# Patient Record
Sex: FEMALE | URBAN - METROPOLITAN AREA
[De-identification: names, ages, dates, MRNs, and addresses within clinical notes are randomized per-mention and may not be internally consistent; named-entity substitution may affect disease eponyms.]

---

## 2019-10-08 ENCOUNTER — APPOINTMENT (RX ONLY)
Dept: URBAN - METROPOLITAN AREA CLINIC 152 | Facility: CLINIC | Age: 84
Setting detail: DERMATOLOGY
End: 2019-10-08

## 2019-10-08 DIAGNOSIS — L91.8 OTHER HYPERTROPHIC DISORDERS OF THE SKIN: ICD-10-CM

## 2019-10-08 DIAGNOSIS — L82.0 INFLAMED SEBORRHEIC KERATOSIS: ICD-10-CM

## 2019-10-08 DIAGNOSIS — L81.4 OTHER MELANIN HYPERPIGMENTATION: ICD-10-CM

## 2019-10-08 DIAGNOSIS — L82.1 OTHER SEBORRHEIC KERATOSIS: ICD-10-CM

## 2019-10-08 PROBLEM — I10 ESSENTIAL (PRIMARY) HYPERTENSION: Status: ACTIVE | Noted: 2019-10-08

## 2019-10-08 PROBLEM — E03.9 HYPOTHYROIDISM, UNSPECIFIED: Status: ACTIVE | Noted: 2019-10-08

## 2019-10-08 PROCEDURE — ? LIQUID NITROGEN

## 2019-10-08 PROCEDURE — ? ELECTRODESICCATION

## 2019-10-08 PROCEDURE — 17110 DESTRUCTION B9 LES UP TO 14: CPT

## 2019-10-08 PROCEDURE — ? COUNSELING

## 2019-10-08 PROCEDURE — 99203 OFFICE O/P NEW LOW 30 MIN: CPT | Mod: 25

## 2019-10-08 ASSESSMENT — LOCATION DETAILED DESCRIPTION DERM
LOCATION DETAILED: RIGHT INFERIOR MEDIAL FOREHEAD
LOCATION DETAILED: LEFT CLAVICULAR SKIN
LOCATION DETAILED: LEFT INFERIOR LATERAL NECK
LOCATION DETAILED: RIGHT LATERAL SUPERIOR CHEST
LOCATION DETAILED: RIGHT CLAVICULAR NECK
LOCATION DETAILED: RIGHT ANTERIOR SHOULDER
LOCATION DETAILED: RIGHT CLAVICULAR SKIN
LOCATION DETAILED: RIGHT INFERIOR ANTERIOR NECK

## 2019-10-08 ASSESSMENT — LOCATION ZONE DERM
LOCATION ZONE: FACE
LOCATION ZONE: ARM
LOCATION ZONE: NECK
LOCATION ZONE: TRUNK

## 2019-10-08 ASSESSMENT — LOCATION SIMPLE DESCRIPTION DERM
LOCATION SIMPLE: LEFT ANTERIOR NECK
LOCATION SIMPLE: RIGHT SHOULDER
LOCATION SIMPLE: RIGHT FOREHEAD
LOCATION SIMPLE: RIGHT CLAVICULAR SKIN
LOCATION SIMPLE: CHEST
LOCATION SIMPLE: RIGHT ANTERIOR NECK
LOCATION SIMPLE: LEFT CLAVICULAR SKIN

## 2019-10-08 NOTE — PROCEDURE: MIPS QUALITY
Quality 110: Preventive Care And Screening: Influenza Immunization: Influenza Immunization Administered during Influenza season
Quality 474: Zoster Vaccination Status: Shingrix Vaccination Administered or Previously Received
Quality 111:Pneumonia Vaccination Status For Older Adults: Pneumococcal Vaccination Previously Received
Detail Level: Detailed
Quality 131: Pain Assessment And Follow-Up: Pain assessment using a standardized tool is documented as negative, no follow-up plan required

## 2022-02-08 ENCOUNTER — PREPPED CHART (OUTPATIENT)
Dept: URBAN - METROPOLITAN AREA CLINIC 89 | Facility: CLINIC | Age: 87
End: 2022-02-08

## 2022-02-08 PROBLEM — H35.3122 DRY AMD, INTERMEDIATE DRY STAGE: Noted: 2022-02-08

## 2022-02-08 PROBLEM — H43.813 POSTERIOR VITREOUS DETACHMENT: Noted: 2022-02-08

## 2022-02-08 PROBLEM — H35.3211 NEOVASCULAR AMD WITH ACTIVE CNV: Noted: 2022-02-08

## 2022-03-17 ASSESSMENT — VISUAL ACUITY
OS_CC: 20/25-1
OD_CC: 20/30

## 2022-03-17 ASSESSMENT — TONOMETRY
OD_IOP_MMHG: 16
OS_IOP_MMHG: 17

## 2022-03-22 ENCOUNTER — FOLLOW UP (OUTPATIENT)
Dept: URBAN - METROPOLITAN AREA CLINIC 89 | Facility: CLINIC | Age: 87
End: 2022-03-22

## 2022-03-22 DIAGNOSIS — H43.813: ICD-10-CM

## 2022-03-22 DIAGNOSIS — H35.3211: ICD-10-CM

## 2022-03-22 DIAGNOSIS — H35.3122: ICD-10-CM

## 2022-03-22 PROCEDURE — 92014 COMPRE OPH EXAM EST PT 1/>: CPT | Mod: 25

## 2022-03-22 PROCEDURE — 67028 INJECTION EYE DRUG: CPT

## 2022-03-22 PROCEDURE — 92134 CPTRZ OPH DX IMG PST SGM RTA: CPT

## 2022-03-22 PROCEDURE — 92202 OPSCPY EXTND ON/MAC DRAW: CPT | Mod: 59

## 2022-03-22 PROCEDURE — PF5 LUCENTIS PREFILLED SYRINGE

## 2022-03-22 ASSESSMENT — VISUAL ACUITY
OS_SC: 20/25-2
OD_SC: 20/40-

## 2022-03-22 ASSESSMENT — TONOMETRY
OD_IOP_MMHG: 14
OS_IOP_MMHG: 14

## 2022-05-24 ENCOUNTER — FOLLOW UP (OUTPATIENT)
Dept: URBAN - METROPOLITAN AREA CLINIC 89 | Facility: CLINIC | Age: 87
End: 2022-05-24

## 2022-05-24 DIAGNOSIS — H35.3122: ICD-10-CM

## 2022-05-24 DIAGNOSIS — H43.813: ICD-10-CM

## 2022-05-24 DIAGNOSIS — H35.3211: ICD-10-CM

## 2022-05-24 PROCEDURE — PF5 LUCENTIS PREFILLED SYRINGE

## 2022-05-24 PROCEDURE — 67028 INJECTION EYE DRUG: CPT

## 2022-05-24 PROCEDURE — 92014 COMPRE OPH EXAM EST PT 1/>: CPT | Mod: 25

## 2022-05-24 PROCEDURE — 92202 OPSCPY EXTND ON/MAC DRAW: CPT | Mod: 59

## 2022-05-24 PROCEDURE — 92134 CPTRZ OPH DX IMG PST SGM RTA: CPT

## 2022-05-24 ASSESSMENT — VISUAL ACUITY
OD_CC: 20/25+1
OS_CC: 20/25-2

## 2022-05-24 ASSESSMENT — TONOMETRY
OD_IOP_MMHG: 15
OS_IOP_MMHG: 16

## 2022-07-26 ENCOUNTER — FOLLOW UP (OUTPATIENT)
Dept: URBAN - METROPOLITAN AREA CLINIC 89 | Facility: CLINIC | Age: 87
End: 2022-07-26

## 2022-07-26 DIAGNOSIS — H35.3122: ICD-10-CM

## 2022-07-26 DIAGNOSIS — H43.813: ICD-10-CM

## 2022-07-26 DIAGNOSIS — H35.3211: ICD-10-CM

## 2022-07-26 PROCEDURE — 92134 CPTRZ OPH DX IMG PST SGM RTA: CPT

## 2022-07-26 PROCEDURE — PF5 LUCENTIS PREFILLED SYRINGE

## 2022-07-26 PROCEDURE — 67028 INJECTION EYE DRUG: CPT

## 2022-07-26 PROCEDURE — 92014 COMPRE OPH EXAM EST PT 1/>: CPT | Mod: 25

## 2022-07-26 PROCEDURE — 92202 OPSCPY EXTND ON/MAC DRAW: CPT | Mod: 59

## 2022-07-26 ASSESSMENT — TONOMETRY
OS_IOP_MMHG: 15
OD_IOP_MMHG: 17

## 2022-07-26 ASSESSMENT — VISUAL ACUITY
OD_CC: 20/30-2
OS_CC: 20/25-2

## 2022-11-11 ENCOUNTER — FOLLOW UP (OUTPATIENT)
Dept: URBAN - METROPOLITAN AREA CLINIC 89 | Facility: CLINIC | Age: 87
End: 2022-11-11

## 2022-11-11 DIAGNOSIS — H43.813: ICD-10-CM

## 2022-11-11 DIAGNOSIS — H35.3231: ICD-10-CM

## 2022-11-11 PROCEDURE — PF5 LUCENTIS PREFILLED SYRINGE

## 2022-11-11 PROCEDURE — 92202 OPSCPY EXTND ON/MAC DRAW: CPT | Mod: 59

## 2022-11-11 PROCEDURE — 92134 CPTRZ OPH DX IMG PST SGM RTA: CPT

## 2022-11-11 PROCEDURE — 92014 COMPRE OPH EXAM EST PT 1/>: CPT | Mod: 25

## 2022-11-11 PROCEDURE — 67028 INJECTION EYE DRUG: CPT

## 2022-11-11 ASSESSMENT — TONOMETRY
OS_IOP_MMHG: 15
OD_IOP_MMHG: 14

## 2022-11-11 ASSESSMENT — VISUAL ACUITY
OS_CC: 20/40
OD_CC: 20/40

## 2023-02-01 ENCOUNTER — FOLLOW UP (OUTPATIENT)
Dept: URBAN - METROPOLITAN AREA CLINIC 89 | Facility: CLINIC | Age: 88
End: 2023-02-01

## 2023-02-01 DIAGNOSIS — H43.813: ICD-10-CM

## 2023-02-01 DIAGNOSIS — H35.3231: ICD-10-CM

## 2023-02-01 PROCEDURE — PF5 LUCENTIS PREFILLED SYRINGE

## 2023-02-01 PROCEDURE — 92014 COMPRE OPH EXAM EST PT 1/>: CPT | Mod: 25

## 2023-02-01 PROCEDURE — 67028 INJECTION EYE DRUG: CPT

## 2023-02-01 PROCEDURE — 92134 CPTRZ OPH DX IMG PST SGM RTA: CPT

## 2023-02-01 PROCEDURE — 92202 OPSCPY EXTND ON/MAC DRAW: CPT | Mod: NC

## 2023-02-01 ASSESSMENT — VISUAL ACUITY
OS_CC: 20/40-
OD_CC: 20/40-1

## 2023-02-01 ASSESSMENT — TONOMETRY
OS_IOP_MMHG: 16
OD_IOP_MMHG: 14

## 2023-03-15 ENCOUNTER — FOLLOW UP (OUTPATIENT)
Dept: URBAN - METROPOLITAN AREA CLINIC 89 | Facility: CLINIC | Age: 88
End: 2023-03-15

## 2023-03-15 DIAGNOSIS — H35.3231: ICD-10-CM

## 2023-03-15 DIAGNOSIS — H43.813: ICD-10-CM

## 2023-03-15 PROCEDURE — 92202 OPSCPY EXTND ON/MAC DRAW: CPT | Mod: NC

## 2023-03-15 PROCEDURE — 67028 INJECTION EYE DRUG: CPT

## 2023-03-15 PROCEDURE — 92134 CPTRZ OPH DX IMG PST SGM RTA: CPT

## 2023-03-15 PROCEDURE — PF5 LUCENTIS PREFILLED SYRINGE

## 2023-03-15 PROCEDURE — 92014 COMPRE OPH EXAM EST PT 1/>: CPT | Mod: 25

## 2023-03-15 ASSESSMENT — VISUAL ACUITY
OS_CC: 20/60-2
OD_CC: 20/60-2

## 2023-03-15 ASSESSMENT — TONOMETRY
OS_IOP_MMHG: 15
OD_IOP_MMHG: 15

## 2023-05-02 ENCOUNTER — FOLLOW UP (OUTPATIENT)
Dept: URBAN - METROPOLITAN AREA CLINIC 89 | Facility: CLINIC | Age: 88
End: 2023-05-02

## 2023-05-02 DIAGNOSIS — H43.813: ICD-10-CM

## 2023-05-02 DIAGNOSIS — H35.3231: ICD-10-CM

## 2023-05-02 PROCEDURE — PF5 LUCENTIS PREFILLED SYRINGE

## 2023-05-02 PROCEDURE — 92134 CPTRZ OPH DX IMG PST SGM RTA: CPT

## 2023-05-02 PROCEDURE — 92202 OPSCPY EXTND ON/MAC DRAW: CPT | Mod: 59

## 2023-05-02 PROCEDURE — 92014 COMPRE OPH EXAM EST PT 1/>: CPT | Mod: 25

## 2023-05-02 PROCEDURE — 67028 INJECTION EYE DRUG: CPT

## 2023-05-02 ASSESSMENT — VISUAL ACUITY
OD_CC: 20/50-2
OS_CC: 20/40+2

## 2023-05-02 ASSESSMENT — TONOMETRY
OS_IOP_MMHG: 19
OD_IOP_MMHG: 18

## 2023-05-08 ENCOUNTER — PROBLEM (OUTPATIENT)
Dept: URBAN - METROPOLITAN AREA CLINIC 89 | Facility: CLINIC | Age: 88
End: 2023-05-08

## 2023-05-08 DIAGNOSIS — H35.3231: ICD-10-CM

## 2023-05-08 DIAGNOSIS — H57.11: ICD-10-CM

## 2023-05-08 PROCEDURE — 92014 COMPRE OPH EXAM EST PT 1/>: CPT

## 2023-05-08 PROCEDURE — 92134 CPTRZ OPH DX IMG PST SGM RTA: CPT

## 2023-05-08 PROCEDURE — 92202 OPSCPY EXTND ON/MAC DRAW: CPT

## 2023-05-08 ASSESSMENT — TONOMETRY
OD_IOP_MMHG: 14
OS_IOP_MMHG: 15

## 2023-05-08 ASSESSMENT — VISUAL ACUITY
OD_CC: 20/50-2
OS_CC: 20/40-2

## 2023-07-07 ENCOUNTER — PROBLEM (OUTPATIENT)
Dept: URBAN - METROPOLITAN AREA CLINIC 89 | Facility: CLINIC | Age: 88
End: 2023-07-07

## 2023-07-07 DIAGNOSIS — H35.3231: ICD-10-CM

## 2023-07-07 DIAGNOSIS — H43.813: ICD-10-CM

## 2023-07-07 PROCEDURE — 92201 OPSCPY EXTND RTA DRAW UNI/BI: CPT | Mod: 59

## 2023-07-07 PROCEDURE — PF5 LUCENTIS PREFILLED SYRINGE: Mod: JZ

## 2023-07-07 PROCEDURE — 92134 CPTRZ OPH DX IMG PST SGM RTA: CPT

## 2023-07-07 PROCEDURE — 92014 COMPRE OPH EXAM EST PT 1/>: CPT | Mod: 25

## 2023-07-07 PROCEDURE — 67028 INJECTION EYE DRUG: CPT

## 2023-07-07 ASSESSMENT — TONOMETRY
OS_IOP_MMHG: 13
OD_IOP_MMHG: 13

## 2023-07-07 ASSESSMENT — VISUAL ACUITY
OD_CC: 20/40
OS_CC: 20/50

## 2023-08-18 ENCOUNTER — FOLLOW UP (OUTPATIENT)
Dept: URBAN - METROPOLITAN AREA CLINIC 89 | Facility: CLINIC | Age: 88
End: 2023-08-18

## 2023-08-18 DIAGNOSIS — H43.813: ICD-10-CM

## 2023-08-18 DIAGNOSIS — H35.3231: ICD-10-CM

## 2023-08-18 PROCEDURE — 67028 INJECTION EYE DRUG: CPT

## 2023-08-18 PROCEDURE — 92202 OPSCPY EXTND ON/MAC DRAW: CPT | Mod: 59

## 2023-08-18 PROCEDURE — 92134 CPTRZ OPH DX IMG PST SGM RTA: CPT

## 2023-08-18 PROCEDURE — 92014 COMPRE OPH EXAM EST PT 1/>: CPT | Mod: 25

## 2023-08-18 PROCEDURE — PF5 LUCENTIS PREFILLED SYRINGE: Mod: JZ

## 2023-08-18 ASSESSMENT — VISUAL ACUITY
OD_CC: 20/30-2
OS_CC: 20/50

## 2023-08-18 ASSESSMENT — TONOMETRY
OS_IOP_MMHG: 11
OD_IOP_MMHG: 9

## 2023-09-29 ENCOUNTER — FOLLOW UP (OUTPATIENT)
Dept: URBAN - METROPOLITAN AREA CLINIC 89 | Facility: CLINIC | Age: 88
End: 2023-09-29

## 2023-09-29 DIAGNOSIS — H43.813: ICD-10-CM

## 2023-09-29 DIAGNOSIS — H35.3231: ICD-10-CM

## 2023-09-29 PROCEDURE — 92134 CPTRZ OPH DX IMG PST SGM RTA: CPT

## 2023-09-29 PROCEDURE — 67028 INJECTION EYE DRUG: CPT

## 2023-09-29 PROCEDURE — 92202 OPSCPY EXTND ON/MAC DRAW: CPT | Mod: NC

## 2023-09-29 PROCEDURE — PF5 LUCENTIS PREFILLED SYRINGE: Mod: JZ

## 2023-09-29 PROCEDURE — 92014 COMPRE OPH EXAM EST PT 1/>: CPT | Mod: 25

## 2023-09-29 ASSESSMENT — VISUAL ACUITY
OS_CC: 20/40-2
OD_CC: 20/25-2

## 2023-09-29 ASSESSMENT — TONOMETRY
OS_IOP_MMHG: 13
OD_IOP_MMHG: 13

## 2023-11-07 ENCOUNTER — FOLLOW UP (OUTPATIENT)
Dept: URBAN - METROPOLITAN AREA CLINIC 89 | Facility: CLINIC | Age: 88
End: 2023-11-07

## 2023-11-07 DIAGNOSIS — H43.813: ICD-10-CM

## 2023-11-07 DIAGNOSIS — H35.3231: ICD-10-CM

## 2023-11-07 PROCEDURE — 67028 INJECTION EYE DRUG: CPT

## 2023-11-07 PROCEDURE — 92134 CPTRZ OPH DX IMG PST SGM RTA: CPT

## 2023-11-07 PROCEDURE — 92014 COMPRE OPH EXAM EST PT 1/>: CPT | Mod: 25

## 2023-11-07 PROCEDURE — 92202 OPSCPY EXTND ON/MAC DRAW: CPT | Mod: NC

## 2023-11-07 ASSESSMENT — TONOMETRY
OD_IOP_MMHG: 8
OS_IOP_MMHG: 16

## 2023-11-07 ASSESSMENT — VISUAL ACUITY
OS_CC: 20/60-1
OD_CC: 20/50-1

## 2023-12-19 ENCOUNTER — FOLLOW UP (OUTPATIENT)
Dept: URBAN - METROPOLITAN AREA CLINIC 89 | Facility: CLINIC | Age: 88
End: 2023-12-19

## 2023-12-19 DIAGNOSIS — H43.813: ICD-10-CM

## 2023-12-19 DIAGNOSIS — H35.3231: ICD-10-CM

## 2023-12-19 PROCEDURE — 92014 COMPRE OPH EXAM EST PT 1/>: CPT | Mod: 25

## 2023-12-19 PROCEDURE — 92134 CPTRZ OPH DX IMG PST SGM RTA: CPT

## 2023-12-19 PROCEDURE — 92202 OPSCPY EXTND ON/MAC DRAW: CPT | Mod: 59

## 2023-12-19 PROCEDURE — 67028 INJECTION EYE DRUG: CPT

## 2023-12-19 ASSESSMENT — VISUAL ACUITY
OD_CC: 20/50-2
OS_CC: 20/60-1

## 2023-12-19 ASSESSMENT — TONOMETRY
OS_IOP_MMHG: 29
OS_IOP_MMHG: 18
OD_IOP_MMHG: 18
OD_IOP_MMHG: 25

## 2024-01-30 ENCOUNTER — FOLLOW UP (OUTPATIENT)
Dept: URBAN - METROPOLITAN AREA CLINIC 89 | Facility: CLINIC | Age: 89
End: 2024-01-30

## 2024-01-30 DIAGNOSIS — H43.813: ICD-10-CM

## 2024-01-30 DIAGNOSIS — H35.3231: ICD-10-CM

## 2024-01-30 PROCEDURE — 92201 OPSCPY EXTND RTA DRAW UNI/BI: CPT | Mod: 59

## 2024-01-30 PROCEDURE — 67028 INJECTION EYE DRUG: CPT

## 2024-01-30 PROCEDURE — 92134 CPTRZ OPH DX IMG PST SGM RTA: CPT

## 2024-01-30 PROCEDURE — 92014 COMPRE OPH EXAM EST PT 1/>: CPT | Mod: 25

## 2024-01-30 ASSESSMENT — TONOMETRY
OS_IOP_MMHG: 19
OD_IOP_MMHG: 19

## 2024-01-30 ASSESSMENT — VISUAL ACUITY
OS_CC: 20/50-2
OD_CC: 20/70+2

## 2024-03-12 ENCOUNTER — FOLLOW UP (OUTPATIENT)
Dept: URBAN - METROPOLITAN AREA CLINIC 89 | Facility: CLINIC | Age: 89
End: 2024-03-12

## 2024-03-12 DIAGNOSIS — H35.3231: ICD-10-CM

## 2024-03-12 DIAGNOSIS — H43.813: ICD-10-CM

## 2024-03-12 PROCEDURE — 92014 COMPRE OPH EXAM EST PT 1/>: CPT | Mod: 25

## 2024-03-12 PROCEDURE — 92202 OPSCPY EXTND ON/MAC DRAW: CPT | Mod: 59

## 2024-03-12 PROCEDURE — 92134 CPTRZ OPH DX IMG PST SGM RTA: CPT

## 2024-03-12 PROCEDURE — 67028 INJECTION EYE DRUG: CPT

## 2024-03-12 ASSESSMENT — VISUAL ACUITY
OS_CC: 20/30-2
OD_CC: 20/30

## 2024-03-12 ASSESSMENT — TONOMETRY
OD_IOP_MMHG: 19
OS_IOP_MMHG: 17

## 2024-05-07 ENCOUNTER — FOLLOW UP (OUTPATIENT)
Dept: URBAN - METROPOLITAN AREA CLINIC 89 | Facility: CLINIC | Age: 89
End: 2024-05-07

## 2024-05-07 DIAGNOSIS — H35.3231: ICD-10-CM

## 2024-05-07 DIAGNOSIS — H43.813: ICD-10-CM

## 2024-05-07 PROCEDURE — 92134 CPTRZ OPH DX IMG PST SGM RTA: CPT

## 2024-05-07 PROCEDURE — 92014 COMPRE OPH EXAM EST PT 1/>: CPT | Mod: 25

## 2024-05-07 PROCEDURE — 92202 OPSCPY EXTND ON/MAC DRAW: CPT | Mod: 59

## 2024-05-07 PROCEDURE — 67028 INJECTION EYE DRUG: CPT

## 2024-05-07 ASSESSMENT — TONOMETRY
OD_IOP_MMHG: 12
OS_IOP_MMHG: 12

## 2024-05-07 ASSESSMENT — VISUAL ACUITY
OD_CC: 20/100
OS_CC: 20/80-1

## 2024-07-16 ENCOUNTER — FOLLOW UP (OUTPATIENT)
Dept: URBAN - METROPOLITAN AREA CLINIC 89 | Facility: CLINIC | Age: 89
End: 2024-07-16

## 2024-07-16 DIAGNOSIS — H43.813: ICD-10-CM

## 2024-07-16 DIAGNOSIS — H35.3231: ICD-10-CM

## 2024-07-16 DIAGNOSIS — H04.201: ICD-10-CM

## 2024-07-16 PROCEDURE — 92014 COMPRE OPH EXAM EST PT 1/>: CPT | Mod: 25

## 2024-07-16 PROCEDURE — 92202 OPSCPY EXTND ON/MAC DRAW: CPT | Mod: 59

## 2024-07-16 PROCEDURE — 92134 CPTRZ OPH DX IMG PST SGM RTA: CPT

## 2024-07-16 PROCEDURE — 67028 INJECTION EYE DRUG: CPT

## 2024-07-16 ASSESSMENT — VISUAL ACUITY
OD_CC: 20/70
OS_CC: 20/50-2

## 2024-07-16 ASSESSMENT — TONOMETRY
OD_IOP_MMHG: 14
OS_IOP_MMHG: 17

## 2025-01-08 ENCOUNTER — FOLLOW UP (OUTPATIENT)
Dept: URBAN - METROPOLITAN AREA CLINIC 89 | Facility: CLINIC | Age: OVER 89
End: 2025-01-08

## 2025-01-08 DIAGNOSIS — H35.3231: ICD-10-CM

## 2025-01-08 DIAGNOSIS — H43.813: ICD-10-CM

## 2025-01-08 PROCEDURE — 92134 CPTRZ OPH DX IMG PST SGM RTA: CPT

## 2025-01-08 PROCEDURE — 67028 INJECTION EYE DRUG: CPT

## 2025-01-08 PROCEDURE — 92014 COMPRE OPH EXAM EST PT 1/>: CPT | Mod: 25

## 2025-01-08 PROCEDURE — 92202 OPSCPY EXTND ON/MAC DRAW: CPT | Mod: 59

## 2025-01-08 ASSESSMENT — TONOMETRY
OD_IOP_MMHG: 16
OS_IOP_MMHG: 14

## 2025-01-08 ASSESSMENT — VISUAL ACUITY
OS_CC: 20/60+2
OD_CC: 20/50+2

## 2025-02-19 ENCOUNTER — FOLLOW UP (OUTPATIENT)
Dept: URBAN - METROPOLITAN AREA CLINIC 89 | Facility: CLINIC | Age: OVER 89
End: 2025-02-19

## 2025-02-19 DIAGNOSIS — H43.813: ICD-10-CM

## 2025-02-19 DIAGNOSIS — H04.123: ICD-10-CM

## 2025-02-19 DIAGNOSIS — H35.3231: ICD-10-CM

## 2025-02-19 PROCEDURE — 67028 INJECTION EYE DRUG: CPT

## 2025-02-19 PROCEDURE — 92134 CPTRZ OPH DX IMG PST SGM RTA: CPT

## 2025-02-19 PROCEDURE — 92014 COMPRE OPH EXAM EST PT 1/>: CPT | Mod: 25

## 2025-02-19 ASSESSMENT — VISUAL ACUITY
OD_CC: 20/150-1
OS_CC: 20/60-2
OD_PH: 20/80-1

## 2025-03-19 ENCOUNTER — FOLLOW UP (OUTPATIENT)
Dept: URBAN - METROPOLITAN AREA CLINIC 89 | Facility: CLINIC | Age: OVER 89
End: 2025-03-19

## 2025-03-19 DIAGNOSIS — H35.3231: ICD-10-CM

## 2025-03-19 DIAGNOSIS — H43.813: ICD-10-CM

## 2025-03-19 DIAGNOSIS — H04.123: ICD-10-CM

## 2025-03-19 PROCEDURE — 92202 OPSCPY EXTND ON/MAC DRAW: CPT | Mod: 59

## 2025-03-19 PROCEDURE — 67028 INJECTION EYE DRUG: CPT

## 2025-03-19 PROCEDURE — 92014 COMPRE OPH EXAM EST PT 1/>: CPT | Mod: 25

## 2025-03-19 PROCEDURE — 92134 CPTRZ OPH DX IMG PST SGM RTA: CPT

## 2025-03-19 ASSESSMENT — VISUAL ACUITY
OS_CC: 20/50-2
OD_CC: 20/50-1

## 2025-03-19 ASSESSMENT — TONOMETRY
OD_IOP_MMHG: 15
OS_IOP_MMHG: 17